# Patient Record
Sex: MALE | Race: WHITE | NOT HISPANIC OR LATINO | Employment: FULL TIME | URBAN - METROPOLITAN AREA
[De-identification: names, ages, dates, MRNs, and addresses within clinical notes are randomized per-mention and may not be internally consistent; named-entity substitution may affect disease eponyms.]

---

## 2018-08-16 ENCOUNTER — HOSPITAL ENCOUNTER (OUTPATIENT)
Dept: BEHAVIORAL HEALTH | Facility: MEDICAL CENTER | Age: 48
End: 2018-08-16
Attending: PSYCHIATRY & NEUROLOGY
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
## 2018-08-16 NOTE — PROGRESS NOTES
I have reviewed the note by Shanice Sherman RN and agree with the initial assessment and treatment plan.    1. Admit to Intensive Outpatient Program - pending per patient decision   - Group therapy per schedule,    - Psychoeducational groups per schedule,   - Individual/family counseling sessions with  per treatment plan.  2. Symptoms necessitating Intensive Outpatient Treatment: excessive alcohol and cannabis use  3. Medical screening/Physical exam per primary care provider or referring facility.

## 2018-08-16 NOTE — Clinical Note
Phoenix will decide if he is willing to quit smoking marijuana as well as stopping alcohol and let us know if he will come to EOP next week.